# Patient Record
(demographics unavailable — no encounter records)

---

## 2025-07-30 NOTE — HISTORY OF PRESENT ILLNESS
[FreeTextEntry1] : Patient presents for follow up from inpatient stay.  Patient s/p loss at 18wks on 7/9 at [facility].  Had D&C for retained placenta.  Went to [facility] and admitted for postpartum endometritis.  S/p repeat D&C and given blood transfusion.  Sent home with extended dwell line with ID follow up for IV antibiotics.  Patient states that she is doing well. No further bleeding, no pain.  Has follow up with ID.  Discussed pathology results that showed POC.  All questions answered.

## 2025-07-30 NOTE — PLAN
[FreeTextEntry1] : Continue zosyn as per ID return to IVF [facility] for further management of future pregnancies All questions answered 
21